# Patient Record
Sex: FEMALE | Race: OTHER | NOT HISPANIC OR LATINO | URBAN - METROPOLITAN AREA
[De-identification: names, ages, dates, MRNs, and addresses within clinical notes are randomized per-mention and may not be internally consistent; named-entity substitution may affect disease eponyms.]

---
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
## 2023-02-02 ENCOUNTER — APPOINTMENT (OUTPATIENT)
Dept: DERMATOLOGY | Facility: CLINIC | Age: 23
End: 2023-02-02

## 2023-12-20 ENCOUNTER — OFFICE (OUTPATIENT)
Dept: URBAN - METROPOLITAN AREA CLINIC 116 | Facility: CLINIC | Age: 23
Setting detail: OPHTHALMOLOGY
End: 2023-12-20
Payer: COMMERCIAL

## 2023-12-20 DIAGNOSIS — H10.433: ICD-10-CM

## 2023-12-20 PROCEDURE — 92014 COMPRE OPH EXAM EST PT 1/>: CPT | Performed by: OPTOMETRIST

## 2023-12-20 ASSESSMENT — REFRACTION_MANIFEST
OS_SPHERE: -0.50
OD_SPHERE: -0.50
OD_CYLINDER: -0.50
OD_AXIS: 170
OD_SPHERE: -0.50
OD_VA1: 20/20
OS_CYLINDER: -0.75
OS_AXIS: 005
OS_VA1: 20/20
OS_AXIS: 010
OS_VA1: 20/20
OS_CYLINDER: SPH
OD_VA1: 20/20
OS_SPHERE: -0.50
OS_VA1: 20/20
OS_SPHERE: -0.50
OD_CYLINDER: SPH
OD_AXIS: 180
OD_SPHERE: -0.50
OS_AXIS: 005
OS_SPHERE: -0.50
OS_VA1: 20/20
OD_AXIS: 180
OD_CYLINDER: -0.75
OS_CYLINDER: -0.50
OS_CYLINDER: -0.75
OD_VA1: 20/20
OD_SPHERE: -0.50
OD_CYLINDER: -0.75
OD_VA1: 20/20

## 2023-12-20 ASSESSMENT — REFRACTION_CURRENTRX
OS_CYLINDER: -0.50
OS_AXIS: 011
OD_SPHERE: -0.75
OD_CYLINDER: -0.25
OS_AXIS: 180
OD_OVR_VA: 20/
OD_VPRISM_DIRECTION: SV
OS_SPHERE: -0.50
OS_OVR_VA: 20/
OD_SPHERE: -0.50
OD_CYLINDER: -0.25
OD_AXIS: 006
OD_AXIS: 74
OS_VPRISM_DIRECTION: SV
OS_AXIS: 0
OS_CYLINDER: 0.00
OD_OVR_VA: 20/
OD_CYLINDER: -0.75
OS_CYLINDER: -0.75
OD_SPHERE: -0.50
OD_OVR_VA: 20/
OS_OVR_VA: 20/
OD_AXIS: 155
OS_SPHERE: -0.50
OS_OVR_VA: 20/
OS_SPHERE: -0.50

## 2023-12-20 ASSESSMENT — SPHEQUIV_DERIVED
OD_SPHEQUIV: -1
OD_SPHEQUIV: -0.875
OD_SPHEQUIV: -0.875
OD_SPHEQUIV: -0.75
OS_SPHEQUIV: -0.875
OS_SPHEQUIV: -0.875
OS_SPHEQUIV: -0.75
OS_SPHEQUIV: -1.25

## 2023-12-20 ASSESSMENT — REFRACTION_AUTOREFRACTION
OS_CYLINDER: -1.50
OD_AXIS: 179
OS_AXIS: 009
OD_CYLINDER: -1.00
OS_SPHERE: -0.50
OD_SPHERE: -0.50

## 2023-12-20 ASSESSMENT — CONFRONTATIONAL VISUAL FIELD TEST (CVF)
OD_FINDINGS: FULL
OS_FINDINGS: FULL

## 2024-01-17 ENCOUNTER — OFFICE (OUTPATIENT)
Dept: URBAN - METROPOLITAN AREA CLINIC 94 | Facility: CLINIC | Age: 24
Setting detail: OPHTHALMOLOGY
End: 2024-01-17
Payer: COMMERCIAL

## 2024-01-17 DIAGNOSIS — H52.13: ICD-10-CM

## 2024-01-17 PROCEDURE — SCREF LASIK EVAL: Performed by: OPHTHALMOLOGY

## 2024-01-17 ASSESSMENT — SPHEQUIV_DERIVED
OS_SPHEQUIV: -1.25
OS_SPHEQUIV: -0.875
OS_SPHEQUIV: -1.125
OD_SPHEQUIV: -0.625
OD_SPHEQUIV: -0.75
OD_SPHEQUIV: -0.875
OD_SPHEQUIV: -0.875
OS_SPHEQUIV: -0.875

## 2024-01-17 ASSESSMENT — REFRACTION_CURRENTRX
OS_OVR_VA: 20/
OD_OVR_VA: 20/
OS_CYLINDER: -0.50
OS_VPRISM_DIRECTION: SV
OD_AXIS: 006
OD_CYLINDER: -0.25
OS_AXIS: 180
OD_SPHERE: -0.50
OS_AXIS: 011
OD_VPRISM_DIRECTION: SV
OS_CYLINDER: -0.75
OD_CYLINDER: -0.75
OS_SPHERE: -0.50
OS_AXIS: 0
OD_CYLINDER: -0.25
OD_SPHERE: -0.50
OD_AXIS: 155
OS_OVR_VA: 20/
OD_AXIS: 74
OD_SPHERE: -0.75
OS_SPHERE: -0.50
OD_OVR_VA: 20/
OS_SPHERE: -0.50
OD_OVR_VA: 20/
OS_CYLINDER: 0.00
OS_OVR_VA: 20/

## 2024-01-17 ASSESSMENT — REFRACTION_MANIFEST
OU_VA: 20/20
OS_AXIS: 005
OS_SPHERE: -0.50
OS_SPHERE: -0.50
OD_VA1: 20/20
OD_VA1: 20/20
OD_SPHERE: -0.50
OD_AXIS: 001
OD_SPHERE: -0.50
OD_SPHERE: -0.25
OS_VA1: 20/20
OS_CYLINDER: -1.25
OD_VA1: 20/20
OD_CYLINDER: -0.75
OS_VA1: 20/20
OS_CYLINDER: -0.75
OS_AXIS: 010
OD_SPHERE: -0.50
OD_AXIS: 180
OS_CYLINDER: -0.75
OS_AXIS: 005
OD_CYLINDER: SPH
OD_CYLINDER: -0.75
OD_CYLINDER: -0.75
OS_SPHERE: -0.50
OS_CYLINDER: SPH
OS_SPHERE: -0.50
OD_AXIS: 180
OD_VA1: 20/20

## 2024-01-17 ASSESSMENT — REFRACTION_AUTOREFRACTION
OD_AXIS: 001
OS_SPHERE: -0.50
OS_CYLINDER: -1.50
OD_SPHERE: -0.25
OD_CYLINDER: -1.00
OS_AXIS: 010

## 2024-01-17 ASSESSMENT — CONFRONTATIONAL VISUAL FIELD TEST (CVF)
OS_FINDINGS: FULL
OD_FINDINGS: FULL

## 2024-04-16 NOTE — ED ADULT TRIAGE NOTE - RESPIRATORY RATE (BREATHS/MIN)
How Severe Are Your Spot(S)?: mild What Is The Reason For Today's Visit?: Full Body Skin Examination What Is The Reason For Today's Visit? (Being Monitored For X): surveillance against the recurrence of atypical nevi 20

## 2024-07-26 ENCOUNTER — NON-APPOINTMENT (OUTPATIENT)
Age: 24
End: 2024-07-26

## 2025-03-20 ENCOUNTER — OFFICE (OUTPATIENT)
Dept: URBAN - METROPOLITAN AREA CLINIC 94 | Facility: CLINIC | Age: 25
Setting detail: OPHTHALMOLOGY
End: 2025-03-20
Payer: COMMERCIAL

## 2025-03-20 DIAGNOSIS — H10.433: ICD-10-CM

## 2025-03-20 DIAGNOSIS — H52.13: ICD-10-CM

## 2025-03-20 DIAGNOSIS — H53.9: ICD-10-CM

## 2025-03-20 PROCEDURE — 92014 COMPRE OPH EXAM EST PT 1/>: CPT | Performed by: OPHTHALMOLOGY

## 2025-03-20 PROCEDURE — 92015 DETERMINE REFRACTIVE STATE: CPT | Performed by: OPHTHALMOLOGY

## 2025-03-20 ASSESSMENT — REFRACTION_CURRENTRX
OD_VPRISM_DIRECTION: SV
OD_CYLINDER: -0.75
OS_AXIS: 011
OD_AXIS: 006
OS_CYLINDER: -0.50
OS_OVR_VA: 20/
OD_SPHERE: -0.75
OS_AXIS: 0
OD_SPHERE: -0.50
OD_OVR_VA: 20/
OS_SPHERE: -0.50
OS_CYLINDER: 0.00
OD_CYLINDER: -0.25
OD_OVR_VA: 20/
OS_AXIS: 180
OD_SPHERE: -0.50
OD_SPHERE: -0.50
OS_OVR_VA: 20/
OD_AXIS: 155
OD_VPRISM_DIRECTION: SV
OS_VPRISM_DIRECTION: SV
OS_SPHERE: -0.50
OS_CYLINDER: -0.75
OS_SPHERE: -0.50
OD_AXIS: 174
OS_AXIS: 003
OS_VPRISM_DIRECTION: SV
OD_OVR_VA: 20/
OS_CYLINDER: -0.75
OS_SPHERE: -0.50
OD_CYLINDER: -0.25
OD_CYLINDER: -0.75
OD_AXIS: 74
OS_OVR_VA: 20/

## 2025-03-20 ASSESSMENT — REFRACTION_AUTOREFRACTION
OD_SPHERE: -0.75
OD_CYLINDER: -0.50
OS_SPHERE: -0.75
OD_AXIS: 180
OS_AXIS: 005
OS_CYLINDER: -1.00

## 2025-03-20 ASSESSMENT — REFRACTION_MANIFEST
OD_AXIS: 180
OS_CYLINDER: -0.75
OD_CYLINDER: -0.75
OS_AXIS: 005
OS_AXIS: 010
OS_VA1: 20/20
OS_SPHERE: -0.50
OS_VA1: 20/20
OS_SPHERE: -0.50
OD_SPHERE: -0.50
OS_CYLINDER: -0.75
OD_CYLINDER: -0.50
OS_SPHERE: -1.00
OD_VA1: 20/20
OS_VA1: 20/20
OD_VA1: 20/20
OD_SPHERE: -1.00
OD_AXIS: 180
OD_VA1: 20/20
OD_AXIS: 001
OD_CYLINDER: -0.75
OD_SPHERE: -0.25
OS_CYLINDER: -0.75
OD_SPHERE: -0.50
OD_VA1: 20/20
OD_AXIS: 180
OS_AXIS: 5
OD_CYLINDER: -0.75
OS_SPHERE: -0.50
OS_AXIS: 005
OU_VA: 20/20
OS_CYLINDER: -1.25
OS_VA1: 20/20

## 2025-03-20 ASSESSMENT — KERATOMETRY
OS_K1POWER_DIOPTERS: 40.75
OD_AXISANGLE_DEGREES: 097
OD_K2POWER_DIOPTERS: 41.25
OD_K1POWER_DIOPTERS: 40.75
METHOD_AUTO_MANUAL: AUTO
OS_K2POWER_DIOPTERS: 41.75
OS_AXISANGLE_DEGREES: 092

## 2025-03-20 ASSESSMENT — TONOMETRY
OD_IOP_MMHG: 14
OS_IOP_MMHG: 16

## 2025-03-20 ASSESSMENT — CONFRONTATIONAL VISUAL FIELD TEST (CVF)
OD_FINDINGS: FULL
OS_FINDINGS: FULL

## 2025-03-20 ASSESSMENT — VISUAL ACUITY
OS_BCVA: 20/20-1
OD_BCVA: 20/30-1

## 2025-04-17 NOTE — ED PEDIATRIC NURSE NOTE - FALL HARM RISK TYPE OF ASSESSMENT
Saint Kiran, DO (PGY2): 20-year-old female, no significant past medical history, presenting to the ED today for second dose of methotrexate.  Was seen in the ED 7 days ago, diagnosed with ectopic pregnancy, received methotrexate.  Reports that has been experiencing pelvic cramping, however no bleeding.  No nausea, vomiting.  No fever or chills.  No vaginal discharge.  No urinary symptoms.  Reports that she received a call earlier today telling her that her beta-hCG was elevated and that she needed to come to the emergency department.  Upon chart review, initial beta to 98 on 410, 346 on 13th, and 391 yesterday.
Daily Assessment

## 2025-04-29 NOTE — ED PROVIDER NOTE - NEURO NEGATIVE STATEMENT, MLM
no strength deficits were identified
no loss of consciousness, no gait abnormality, no headache, no sensory deficits, and no weakness.